# Patient Record
Sex: FEMALE | URBAN - METROPOLITAN AREA
[De-identification: names, ages, dates, MRNs, and addresses within clinical notes are randomized per-mention and may not be internally consistent; named-entity substitution may affect disease eponyms.]

---

## 2021-06-16 ENCOUNTER — THERAPY VISIT (OUTPATIENT)
Dept: PHYSICAL THERAPY | Facility: CLINIC | Age: 61
End: 2021-06-16
Payer: COMMERCIAL

## 2021-06-16 DIAGNOSIS — G89.29 CHRONIC RIGHT-SIDED LOW BACK PAIN WITHOUT SCIATICA: Primary | ICD-10-CM

## 2021-06-16 DIAGNOSIS — M54.50 CHRONIC RIGHT-SIDED LOW BACK PAIN WITHOUT SCIATICA: Primary | ICD-10-CM

## 2021-06-16 PROCEDURE — 97530 THERAPEUTIC ACTIVITIES: CPT | Mod: GP | Performed by: PHYSICAL THERAPIST

## 2021-06-16 PROCEDURE — 97161 PT EVAL LOW COMPLEX 20 MIN: CPT | Mod: GP | Performed by: PHYSICAL THERAPIST

## 2021-06-16 PROCEDURE — 97110 THERAPEUTIC EXERCISES: CPT | Mod: GP | Performed by: PHYSICAL THERAPIST

## 2021-06-16 NOTE — LETTER
[facility]  [address]  [facility] [address]  [phone]    2021    Re: Flaquita Herrera   :   1960  MRN:  [id number]   REFERRING PHYSICIAN:   Luis ALMODOVAR [facility] MARY    Date of Initial Evaluation:  2021  Visits:   1  Reason for Referral:  Chronic right-sided low back pain without sciatica    [facility] Initial Evaluation -- Lumbar  Date: 2021  Flaquita Herrera is a 60 year old female with a R buttocks condition.   Referral: ALEJANDRA 6 visits  Work mechanical stresses:    Employment status:    Leisure mechanical stresses: running 3.5 miles 3 x week  [place]  Functional disability score (JLUIS/STarT Back):    VAS score (0-10):   Patient goals/expectations:  Sit, drive more than 10-15 min pain free    HISTORY:  Present symptoms: R IT post thigh pain to knee. Over the last 2 weeks or so the pain is primarily post thigh with sitting driving. LBP stiffness after gardening.   Pain quality (sharp/shooting/stabbing/aching/burning/cramping):  Burning in butt at onset  Paresthesia (yes/no):  no  Present since (onset date): MD order [id number]. Late last 2020.  Symptoms (improving/unchanging/worsening):  Worsening past 1 month.  Symptoms commenced as a result of: SUMMER   Condition occurred in the following environment:   unknown   Symptoms at onset (back/thigh/leg): IT  Constant symptoms (back/thigh/leg):   Intermittent symptoms (back/thigh/leg): yes  Symptoms are made worse with the following: Always Bending, Sometimes Sitting, Time of day - No effect and Always On the move   Symptoms are made better with the following: Always Standing, Always Walking and Always On the move  Disturbed sleep (yes/no):  no   Sleeping postures (prone/sup/side R/L):   Previous episodes (0/1-5/6-10/11+): 0   Year of first episode:   Previous history:   Previous treatments:         Re: Flaquita  SULAIMAN Javier   :   1960    Specific Questions:  Cough/Sneeze/Strain (pos/neg): neg  Bowel/Bladder (normal/abnormal): normal   Gait (normal/abnormal): normal   Medications (nil/NSAIDS/analg/steroids/anticoag/other):  Other - na  Medical allergies:  na  General health (excellent/good/fair/poor):  na  Pertinent medical history:  Osteoporosis  Imaging (None/Xray/MRI/Other):  none  Recent or major surgery (yes/no):  no  Night pain (yes/no): no  Accidents (yes/no): no  Unexplained weight loss (yes/no): na  Barriers at home: none  Other red flags: none    EXAMINATION    Posture:   Sitting (good/fair/poor): fair  Standing (good/fair/poor):fair  Lordosis (red/acc/normal): dec to none  Correction of posture (better/worse/no effect): no effect  Lateral Shift (right/left/nil): no  Relevant (yes/no):    Other Observations:     Neurological:  Motor deficit:    Reflexes:    Sensory deficit:    Dural signs:  neg  Movement Loss:   De Mod Min Nil Pain   Flexion   To ankles  Pulling R post thigh   Extension  +      Side Gliding R   +     Side Gliding L   +  R LS     Test Movements:   During: produces, abolishes, increases, decreases, no effect, centralizing, peripheralizing   After: better, worse, no better, no worse, no effect, centralized, peripheralized              Re: Flaquita Herrera   :   1960    Pretest symptoms standin/10   Symptoms During Symptoms After ROM increased ROM decreased No Effect   FIS          Rep FIS Produces    Worse         EIS          Rep EIS No Effect    No Effect    +     Pretest symptoms lying: very uncomfortable so no repeated testing done in this position   Symptoms During Symptoms After ROM increased ROM decreased No Effect   DILIP          Rep DILIP          EIL          Rep EIL          If required, pretest symptoms:    Symptoms During Symptoms After ROM increased ROM decreased No Effect   SGIS - R          Rep SGIS - R          SGIS - L          Rep SGIS - L          Static  Tests:  Sitting slouched:      Sitting erect:    Standing slouched:     Standing erect:    Lying prone in extension:  na  Long sitting:    Other Tests:   Provisional Classification:  Derangement - asymmetrical above knee  Principle of Management:  Education:  HEP proper use of L roll with sitting avoid bending slouching crunches Equipment provided:  Towel roll  Mechanical therapy (Y/N):  Y   Extension principle:  Y    Lateral Principle:    Flexion principle:      Other:      ASSESSMENT/PLAN:  Patient is a 60 year old female with lumbar complaints.    Patient has the following significant findings with corresponding treatment plan.                Diagnosis 1:  Post thigh pain R  Pain -  manual therapy, self management, education, directional preference exercise and home program  Decreased ROM/flexibility - manual therapy, therapeutic exercise, therapeutic activity and home program  Decreased joint mobility - manual therapy, therapeutic exercise, therapeutic activity and home program  Decreased function - therapeutic activities and home program  Impaired posture - neuro re-education, therapeutic activities and home program  Previous and current functional limitations:  (See Goal Flow Sheet for this information)    Short term and Long term goals: (See Goal Flow Sheet for this information)   Communication ability:  Patient appears to be able to clearly communicate and understand verbal and written communication and follow directions correctly.  Treatment Explanation - The following has been discussed with the patient:   RX ordered/plan of care, Anticipated outcomes, Possible risks and side effects    This patient would benefit from PT intervention to resume normal activities.   Rehab potential is excellent.  Frequency:  1 X week, once daily  Duration:  for 6 weeks  Discharge Plan:  Achieve all LTG.  Independent in home treatment program.  Reach maximal therapeutic benefit.    Thank you for your  referral.    INQUIRIES        Therapist:  Flaquita Quigley, PT, Cert. MDT  [facility]  [address] [facility] [address]  [facility] [address]  Phone: [phone]  Fax: [phone]

## 2021-06-16 NOTE — PROGRESS NOTES
[facility] Initial Evaluation -- Lumbar    Date: June 16, 2021  Flaquita Herrera is a 60 year old female with a R buttocks condition.   Referral: LOEW 6 visits  Work mechanical stresses:    Employment status:    Leisure mechanical stresses: running 3.5 miles 3 x week  walking snowshoe[place]  Functional disability score (JLUIS/STarT Back):    VAS score (0-10):   Patient goals/expectations:  Sit, drive more than 10-15 min pain free    HISTORY:    Present symptoms: R IT post thigh pain to knee. Over the last 2 weeks or so the pain is primarily post thigh with sitting driving. LBP stiffness after gardening.   Pain quality (sharp/shooting/stabbing/aching/burning/cramping):  Burning in butt at onset  Paresthesia (yes/no):  no    Present since (onset date): MD order [id number]. Late last Fall 2020.  Symptoms (improving/unchanging/worsening):  Worsening past 1 month.    Symptoms commenced as a result of: SUMMER   Condition occurred in the following environment:   unknown     Symptoms at onset (back/thigh/leg): IT  Constant symptoms (back/thigh/leg):   Intermittent symptoms (back/thigh/leg): yes    Symptoms are made worse with the following: Always Bending, Sometimes Sitting, Time of day - No effect and Always On the move   Symptoms are made better with the following: Always Standing, Always Walking and Always On the move    Disturbed sleep (yes/no):  no Sleeping postures (prone/sup/side R/L):     Previous episodes (0/1-5/6-10/11+): 0 Year of first episode:     Previous history:   Previous treatments:       Specific Questions:  Cough/Sneeze/Strain (pos/neg): neg  Bowel/Bladder (normal/abnormal): normal   Gait (normal/abnormal): normal   Medications (nil/NSAIDS/analg/steroids/anticoag/other):  Other - na  Medical allergies:  na  General health (excellent/good/fair/poor):  na  Pertinent medical history:  Osteoporosis  Imaging (None/Xray/MRI/Other):  none  Recent or major surgery (yes/no):  no  Night pain (yes/no):  no  Accidents (yes/no): no  Unexplained weight loss (yes/no): na  Barriers at home: none  Other red flags: none    EXAMINATION    Posture:   Sitting (good/fair/poor): fair  Standing (good/fair/poor):fair  Lordosis (red/acc/normal): dec to none  Correction of posture (better/worse/no effect): no effect    Lateral Shift (right/left/nil): no  Relevant (yes/no):    Other Observations:     Neurological:    Motor deficit:    Reflexes:    Sensory deficit:    Dural signs:  neg    Movement Loss:   De Mod Min Nil Pain   Flexion   To ankles  Pulling R post thigh   Extension  +      Side Gliding R   +     Side Gliding L   +  R LS     Test Movements:   During: produces, abolishes, increases, decreases, no effect, centralizing, peripheralizing   After: better, worse, no better, no worse, no effect, centralized, peripheralized    Pretest symptoms standin/10   Symptoms During Symptoms After ROM increased ROM decreased No Effect   FIS          Rep FIS Produces    Worse         EIS          Rep EIS No Effect    No Effect    +       Pretest symptoms lying: very uncomfortable so no repeated testing done in this position   Symptoms During Symptoms After ROM increased ROM decreased No Effect   DILIP          Rep DILIP          EIL          Rep EIL            If required, pretest symptoms:    Symptoms During Symptoms After ROM increased ROM decreased No Effect   SGIS - R          Rep SGIS - R          SGIS - L          Rep SGIS - L            Static Tests:  Sitting slouched:     Sitting erect:    Standing slouched:   Standing erect:    Lying prone in extension:  na Long sitting:      Other Tests:     Provisional Classification:  Derangement - asymmetrical above knee    Principle of Management:  Education:  HEP proper use of L roll with sitting avoid bending slouching crunches    Equipment provided:  Towel roll  Mechanical therapy (Y/N):  Y   Extension principle:  Y  Lateral Principle:    Flexion principle:    Other:       ASSESSMENT/PLAN:    Patient is a 60 year old female with lumbar complaints.    Patient has the following significant findings with corresponding treatment plan.                Diagnosis 1:  Post thigh pain R  Pain -  manual therapy, self management, education, directional preference exercise and home program  Decreased ROM/flexibility - manual therapy, therapeutic exercise, therapeutic activity and home program  Decreased joint mobility - manual therapy, therapeutic exercise, therapeutic activity and home program  Decreased function - therapeutic activities and home program  Impaired posture - neuro re-education, therapeutic activities and home program      Previous and current functional limitations:  (See Goal Flow Sheet for this information)    Short term and Long term goals: (See Goal Flow Sheet for this information)     Communication ability:  Patient appears to be able to clearly communicate and understand verbal and written communication and follow directions correctly.  Treatment Explanation - The following has been discussed with the patient:   RX ordered/plan of care  Anticipated outcomes  Possible risks and side effects  This patient would benefit from PT intervention to resume normal activities.   Rehab potential is excellent.    Frequency:  1 X week, once daily  Duration:  for 6 weeks  Discharge Plan:  Achieve all LTG.  Independent in home treatment program.  Reach maximal therapeutic benefit.    Please refer to the daily flowsheet for treatment today, total treatment time and time spent performing 1:1 timed codes.

## 2021-06-28 ENCOUNTER — THERAPY VISIT (OUTPATIENT)
Dept: PHYSICAL THERAPY | Facility: CLINIC | Age: 61
End: 2021-06-28
Payer: COMMERCIAL

## 2021-06-28 DIAGNOSIS — M54.50 BILATERAL LOW BACK PAIN WITHOUT SCIATICA: Primary | ICD-10-CM

## 2021-06-28 PROCEDURE — 97530 THERAPEUTIC ACTIVITIES: CPT | Mod: GP | Performed by: PHYSICAL THERAPY ASSISTANT

## 2021-06-28 PROCEDURE — 97110 THERAPEUTIC EXERCISES: CPT | Mod: GP | Performed by: PHYSICAL THERAPY ASSISTANT

## 2021-07-07 ENCOUNTER — THERAPY VISIT (OUTPATIENT)
Dept: PHYSICAL THERAPY | Facility: CLINIC | Age: 61
End: 2021-07-07
Payer: COMMERCIAL

## 2021-07-07 DIAGNOSIS — M54.41 BILATERAL LOW BACK PAIN WITH RIGHT-SIDED SCIATICA: Primary | ICD-10-CM

## 2021-07-07 PROCEDURE — 97110 THERAPEUTIC EXERCISES: CPT | Mod: GP | Performed by: PHYSICAL THERAPY ASSISTANT

## 2021-07-07 PROCEDURE — 97530 THERAPEUTIC ACTIVITIES: CPT | Mod: GP | Performed by: PHYSICAL THERAPY ASSISTANT

## 2021-07-14 ENCOUNTER — THERAPY VISIT (OUTPATIENT)
Dept: PHYSICAL THERAPY | Facility: CLINIC | Age: 61
End: 2021-07-14
Payer: COMMERCIAL

## 2021-07-14 DIAGNOSIS — M54.41 MIDLINE LOW BACK PAIN WITH RIGHT-SIDED SCIATICA: Primary | ICD-10-CM

## 2021-07-14 PROCEDURE — 97110 THERAPEUTIC EXERCISES: CPT | Mod: GP | Performed by: PHYSICAL THERAPY ASSISTANT

## 2021-07-14 PROCEDURE — 97530 THERAPEUTIC ACTIVITIES: CPT | Mod: GP | Performed by: PHYSICAL THERAPY ASSISTANT

## 2021-07-21 ENCOUNTER — THERAPY VISIT (OUTPATIENT)
Dept: PHYSICAL THERAPY | Facility: CLINIC | Age: 61
End: 2021-07-21
Payer: COMMERCIAL

## 2021-07-21 DIAGNOSIS — M54.50 CHRONIC RIGHT-SIDED LOW BACK PAIN WITHOUT SCIATICA: Primary | ICD-10-CM

## 2021-07-21 DIAGNOSIS — G89.29 CHRONIC RIGHT-SIDED LOW BACK PAIN WITHOUT SCIATICA: Primary | ICD-10-CM

## 2021-07-21 PROCEDURE — 97530 THERAPEUTIC ACTIVITIES: CPT | Mod: GP | Performed by: PHYSICAL THERAPIST

## 2021-07-21 PROCEDURE — 97110 THERAPEUTIC EXERCISES: CPT | Mod: GP | Performed by: PHYSICAL THERAPIST

## 2021-07-28 ENCOUNTER — THERAPY VISIT (OUTPATIENT)
Dept: PHYSICAL THERAPY | Facility: CLINIC | Age: 61
End: 2021-07-28
Payer: COMMERCIAL

## 2021-07-28 DIAGNOSIS — M54.41 BILATERAL LOW BACK PAIN WITH RIGHT-SIDED SCIATICA: Primary | ICD-10-CM

## 2021-07-28 PROCEDURE — 97110 THERAPEUTIC EXERCISES: CPT | Mod: GP | Performed by: PHYSICAL THERAPY ASSISTANT

## 2021-07-28 PROCEDURE — 97530 THERAPEUTIC ACTIVITIES: CPT | Mod: GP | Performed by: PHYSICAL THERAPY ASSISTANT

## 2021-08-16 ENCOUNTER — THERAPY VISIT (OUTPATIENT)
Dept: PHYSICAL THERAPY | Facility: CLINIC | Age: 61
End: 2021-08-16
Payer: COMMERCIAL

## 2021-08-16 DIAGNOSIS — M54.41 BILATERAL LOW BACK PAIN WITH RIGHT-SIDED SCIATICA: Primary | ICD-10-CM

## 2021-08-16 PROCEDURE — 97530 THERAPEUTIC ACTIVITIES: CPT | Mod: GP | Performed by: PHYSICAL THERAPY ASSISTANT

## 2021-08-16 PROCEDURE — 97110 THERAPEUTIC EXERCISES: CPT | Mod: GP | Performed by: PHYSICAL THERAPY ASSISTANT

## 2021-08-24 ENCOUNTER — THERAPY VISIT (OUTPATIENT)
Dept: PHYSICAL THERAPY | Facility: CLINIC | Age: 61
End: 2021-08-24
Payer: COMMERCIAL

## 2021-08-24 DIAGNOSIS — M54.41 BILATERAL LOW BACK PAIN WITH RIGHT-SIDED SCIATICA: Primary | ICD-10-CM

## 2021-08-24 PROCEDURE — 97530 THERAPEUTIC ACTIVITIES: CPT | Mod: GP | Performed by: PHYSICAL THERAPIST

## 2021-08-24 PROCEDURE — 97110 THERAPEUTIC EXERCISES: CPT | Mod: GP | Performed by: PHYSICAL THERAPIST

## 2021-09-13 ENCOUNTER — THERAPY VISIT (OUTPATIENT)
Dept: PHYSICAL THERAPY | Facility: CLINIC | Age: 61
End: 2021-09-13
Payer: COMMERCIAL

## 2021-09-13 DIAGNOSIS — M54.41 BILATERAL LOW BACK PAIN WITH RIGHT-SIDED SCIATICA: Primary | ICD-10-CM

## 2021-09-13 PROCEDURE — 97110 THERAPEUTIC EXERCISES: CPT | Mod: GP | Performed by: PHYSICAL THERAPY ASSISTANT

## 2021-09-13 PROCEDURE — 97530 THERAPEUTIC ACTIVITIES: CPT | Mod: GP | Performed by: PHYSICAL THERAPY ASSISTANT

## 2021-09-13 NOTE — PROGRESS NOTES
Subjective:  HPI  Physical Exam                    Objective:  System    Physical Exam    General     ROS    Assessment/Plan:    DISCHARGE REPORT    Progress reporting period is from 6/16/2021 to 9/13/2021..       SUBJECTIVE   Pt reports being on week 3 of return to run program running 3 min without any leg or LBP. Feels she could run longer but limited by breathing not LB. Has not had any LBP or leg sx for 2 weeks or more now and plans on returning to her chair yoga class tomorrow.      Current Pain level: 0/10.         Changes in function:  Yes (See Goal flowsheet attached for changes in current functional level)  Adverse reaction to treatment or activity: None    OBJECTIVE     LROM WNL without pain or reproduction of L/E sx. MMT R L/E 5/5 and painfree. Sitting posture is good. Discussed return to yoga class suggesting doing press ups or EIS prior to class and after if needed and avoid repetitive or prolonged forward bending. Discussed maintenance performance of either press ups or EIS 2x a day now with instruction to increase back to 5-6x a day if leg sx return.      ASSESSMENT/PLAN  Updated problem list and treatment plan: Diagnosis 1:  LBP    STG/LTGs have been met or progress has been made towards goals:  Yes (See Goal flow sheet completed today.)  Assessment of Progress: The patient has met all of their long term goals.  Self Management Plans:  Patient has been instructed in a home treatment program.  Patient is independent in a home treatment program.  Patient  has been instructed in self management of symptoms.  Patient is independent in self management of symptoms.  I have re-evaluated this patient and find that the nature, scope, duration and intensity of the therapy is appropriate for the medical condition of the patient.  Flaquita continues to require the following intervention to meet STG and LTG's:  PT intervention is no longer required to meet STG/LTG.    Recommendations:  This patient is ready to be  discharged from therapy and continue their home treatment program.  The progress note/discharge summary was written in collaboration with and reviewed by the physical therapist.    Please refer to the daily flowsheet for treatment today, total treatment time and time spent performing 1:1 timed codes.